# Patient Record
(demographics unavailable — no encounter records)

---

## 2024-12-17 NOTE — PHYSICAL EXAM
[No Acute Distress] : no acute distress [Well Nourished] : well nourished [Well Developed] : well developed [Well-Appearing] : well-appearing [Normal Sclera/Conjunctiva] : normal sclera/conjunctiva [PERRL] : pupils equal round and reactive to light [EOMI] : extraocular movements intact [Normal Outer Ear/Nose] : the outer ears and nose were normal in appearance [Normal Oropharynx] : the oropharynx was normal [No JVD] : no jugular venous distention [No Lymphadenopathy] : no lymphadenopathy [Supple] : supple [Thyroid Normal, No Nodules] : the thyroid was normal and there were no nodules present [No Respiratory Distress] : no respiratory distress  [No Accessory Muscle Use] : no accessory muscle use [Wet Cough] : a wet cough was heard [Audible Wheezing] : audible wheezing was heard [Exp Wheezing] : expiratory wheezing was heard [Clear] : clear [Normal] : normal [Normal Rate] : normal rate  [Regular Rhythm] : with a regular rhythm [Normal S1, S2] : normal S1 and S2 [No Murmur] : no murmur heard [No Carotid Bruits] : no carotid bruits [No Abdominal Bruit] : a ~M bruit was not heard ~T in the abdomen [No Varicosities] : no varicosities [Pedal Pulses Present] : the pedal pulses are present [No Edema] : there was no peripheral edema [No Palpable Aorta] : no palpable aorta [No Extremity Clubbing/Cyanosis] : no extremity clubbing/cyanosis [Soft] : abdomen soft [Non Tender] : non-tender [Non-distended] : non-distended [No Masses] : no abdominal mass palpated [No HSM] : no HSM [Normal Bowel Sounds] : normal bowel sounds [Normal Posterior Cervical Nodes] : no posterior cervical lymphadenopathy [Normal Anterior Cervical Nodes] : no anterior cervical lymphadenopathy [No CVA Tenderness] : no CVA  tenderness [No Spinal Tenderness] : no spinal tenderness [No Joint Swelling] : no joint swelling [Grossly Normal Strength/Tone] : grossly normal strength/tone [No Rash] : no rash [Coordination Grossly Intact] : coordination grossly intact [No Focal Deficits] : no focal deficits [Normal Gait] : normal gait [Deep Tendon Reflexes (DTR)] : deep tendon reflexes were 2+ and symmetric [Normal Affect] : the affect was normal [Normal Insight/Judgement] : insight and judgment were intact [Normal Voice/Communication] : normal voice/communication [Normal TMs] : both tympanic membranes were normal [Normal Percussion] : the chest was normal to percussion [Normal Supraclavicular Nodes] : no supraclavicular lymphadenopathy [Kyphosis] : no kyphosis [Scoliosis] : no scoliosis [Acne] : no acne [Speech Grossly Normal] : speech grossly normal [Memory Grossly Normal] : memory grossly normal [Alert and Oriented x3] : oriented to person, place, and time [Normal Mood] : the mood was normal [de-identified] : cobblestoning of nasopharynx [de-identified] : mild scattered expiratory wheeze [de-identified] : obese

## 2024-12-17 NOTE — ASSESSMENT
[FreeTextEntry1] : 52 year old female presents with viral URI with post nasal drip causing asthma exacerbation.

## 2024-12-17 NOTE — HISTORY OF PRESENT ILLNESS
[Cold Symptoms] : cold symptoms [Moderate] : moderate [___ Days ago] : [unfilled] days ago [Sudden] : suddenly [Constant] : constant [Congestion] : congestion [Cough] : cough [Wheezing] : wheezing [Stable] : stable [Sore Throat] : no sore throat [Chills] : no chills [Anorexia] : no anorexia [Shortness Of Breath] : no shortness of breath [Earache] : no earache [Fatigue] : not fatigue [Headache] : no headache [Fever] : no fever [FreeTextEntry2] : discomfort to the epigastric region due to constant coughing [FreeTextEntry8] : KAEL CINTRON is a 52-year-old F who presents today for evaluation of cough, congestion and postnasal drip for 6 days.  Patient reports that these symptoms started on December 12,2024 for which she presented to [facility]. She was given Rx of Prednisone 40 mg qd x 5 days and Augmentin 875mg BID which she started 4 days ago. She was also given Benzonatate for cough which has helped. Patient also notes rib-epigastric region discomfort due to persistent coughing. She has taken all RX as prescribed but her symptoms have not improved. Patient says she also saw Dr Guo, ENT specialist and he noted this is not related to sinus disease but possible RSV.

## 2024-12-17 NOTE — END OF VISIT
[FreeTextEntry3] : I, Genoveva Wang, acted solely as scribe for Dr. See Stewart DO on this date 12/17/2024.  All medical record entries made by the Scribe were at my, Dr. See Stewart DO direction and personally dictated by me on 12/17/2024, I have reviewed the chart and agree that the record accurately reflects my personal performance of the history, physical exam, assessment and plan. I have also personally directed, reviewed and agreed with the chart.   [Time Spent: ___ minutes] : I have spent [unfilled] minutes of time on the encounter which excludes teaching and separately reported services.

## 2024-12-17 NOTE — PHYSICAL EXAM
[No Acute Distress] : no acute distress [Well Nourished] : well nourished [Well Developed] : well developed [Well-Appearing] : well-appearing [Normal Sclera/Conjunctiva] : normal sclera/conjunctiva [PERRL] : pupils equal round and reactive to light [EOMI] : extraocular movements intact [Normal Outer Ear/Nose] : the outer ears and nose were normal in appearance [Normal Oropharynx] : the oropharynx was normal [No JVD] : no jugular venous distention [No Lymphadenopathy] : no lymphadenopathy [Supple] : supple [Thyroid Normal, No Nodules] : the thyroid was normal and there were no nodules present [No Respiratory Distress] : no respiratory distress  [No Accessory Muscle Use] : no accessory muscle use [Wet Cough] : a wet cough was heard [Audible Wheezing] : audible wheezing was heard [Exp Wheezing] : expiratory wheezing was heard [Clear] : clear [Normal] : normal [Normal Rate] : normal rate  [Regular Rhythm] : with a regular rhythm [Normal S1, S2] : normal S1 and S2 [No Murmur] : no murmur heard [No Carotid Bruits] : no carotid bruits [No Abdominal Bruit] : a ~M bruit was not heard ~T in the abdomen [No Varicosities] : no varicosities [Pedal Pulses Present] : the pedal pulses are present [No Edema] : there was no peripheral edema [No Palpable Aorta] : no palpable aorta [No Extremity Clubbing/Cyanosis] : no extremity clubbing/cyanosis [Soft] : abdomen soft [Non Tender] : non-tender [Non-distended] : non-distended [No Masses] : no abdominal mass palpated [No HSM] : no HSM [Normal Bowel Sounds] : normal bowel sounds [Normal Posterior Cervical Nodes] : no posterior cervical lymphadenopathy [Normal Anterior Cervical Nodes] : no anterior cervical lymphadenopathy [No CVA Tenderness] : no CVA  tenderness [No Spinal Tenderness] : no spinal tenderness [No Joint Swelling] : no joint swelling [Grossly Normal Strength/Tone] : grossly normal strength/tone [No Rash] : no rash [Coordination Grossly Intact] : coordination grossly intact [No Focal Deficits] : no focal deficits [Normal Gait] : normal gait [Deep Tendon Reflexes (DTR)] : deep tendon reflexes were 2+ and symmetric [Normal Affect] : the affect was normal [Normal Insight/Judgement] : insight and judgment were intact [Normal Voice/Communication] : normal voice/communication [Normal TMs] : both tympanic membranes were normal [Normal Percussion] : the chest was normal to percussion [Normal Supraclavicular Nodes] : no supraclavicular lymphadenopathy [Kyphosis] : no kyphosis [Scoliosis] : no scoliosis [Acne] : no acne [Speech Grossly Normal] : speech grossly normal [Memory Grossly Normal] : memory grossly normal [Alert and Oriented x3] : oriented to person, place, and time [Normal Mood] : the mood was normal [de-identified] : cobblestoning of nasopharynx [de-identified] : mild scattered expiratory wheeze [de-identified] : obese

## 2024-12-17 NOTE — REVIEW OF SYSTEMS
[Postnasal Drip] : postnasal drip [Wheezing] : wheezing [Cough] : cough [Negative] : Heme/Lymph [Fatigue] : fatigue [Shortness Of Breath] : no shortness of breath [Dyspnea on Exertion] : no dyspnea on exertion [Muscle Pain] : muscle pain [Back Pain] : no back pain [FreeTextEntry6] : rib/chest discomfort due to coughing

## 2024-12-17 NOTE — PLAN
[FreeTextEntry1] : ID/Pulmonary URI-PND-Asthma-cough - finish course of Prednisone 40 mg qd x 5 days -continue to take Augmentin 875mg p.o BID  -start Promethazine w/ Codeine 5CC q 6 hours PRN cough - prescription for chest X-ray given -check nasal swab for respiratory panel -add non-sedating antihistamine daily to dry out PND -increase hydration and rest, call for worsening or persistent symptoms

## 2025-02-06 NOTE — HISTORY OF PRESENT ILLNESS
[FreeTextEntry1] : annual wellness visit [de-identified] : Patient is a 52-year-old female with a past medical history as below who presents for an annual wellness visit. Patient denies heat/cold intolerance on Synthroid (Levothyroxine Sodium). Asthma has been well-managed on Albuterol. Patient has been taking Pantoprazole daily as prescribed. Patient has seen GYN, Dr. Soni in June 2024. Her last Mammogram and Breast US were in March 2024 at [facility]. Patient had a screening colonoscopy in April 2024 with gastroenterologist, Dr. Mcwilliams. Patient has been seeing endocrinologist, Dr. Perlman secondary to history of hypothyroidism. Patient notes seeing a cardiologist in the past after a syncopal episode. Patient denies any significant issues with vision. She denies insomnia. Patient notes increased stress secondary to a busy professional/personal life which causes fatigue and secondarily causes anxiety and anxiousness for which she takes Escitalopram 10mg QD. Patient did not receive the flu vaccine for 5320-5844 season.. She believes she received the Pneumonia Vaccine several years ago at pulmonologist, Dr. Mendoza's office. She has received 5 doses of the COVID-19 Vaccine (Pfizer). Patient cannot recall precisely when she received T-dap vaccine but says she is probably reaching 10 year window. Patient says her diet and exercise has not been optimal recently, but she says at times she would run on the treadmill at home. Patient is fasting today.   Patients complain of eczema on bilateral lower extremities, uses topical steroid cream to manage it and she sees Dr. Christie of dermatology.

## 2025-02-06 NOTE — PLAN
[FreeTextEntry1] : Constitutional fatigue - check CBC, CMP, and thyroid panel  Pulmonary asthma -continue Albuterol Sulfate  MCG/ACT p.r.n. - follow up with pulmonologist, Dr. Mendoza as necessary  Endocrinology hypothyroidism - continue Synthroid (Levothyroxine Sodium) 50mcg p.o.q.d. as directed - continue to f/u with Dr. Perlman - check thyroid panel Dermatology Rosacea- continue to f/u with Dr. Christie and continue to use the topical steroid cream as prescribed. Gynecology Results of most recent breast imaging (Mammogram/Breast US) to be requested from [facility]; continue to f/u with Dr. Soni. Gastroenterology GERD - c/w Pantoprazole 40mg - continue antireflux measures Immunization flu vaccine - denied receiving for 0399-4445 season Pneumovax 23 - evidence of vaccine administration to be requested from pulmonologist, Dr. Mendoza's office S/p COVID-19 Vaccine (Pfizer, x5) - received at her local pharmacy. T-dap- does not recall precise date but reaching 10 year window, will think about receiving in the future.  check EKG (results as above)  blood work- (female panel, hemoglobin A1C, and thyroid panel)

## 2025-02-06 NOTE — HEALTH RISK ASSESSMENT
[Good] : ~his/her~  mood as  good [Yes] : Yes [Monthly or less (1 pt)] : Monthly or less (1 point) [1 or 2 (0 pts)] : 1 or 2 (0 points) [Never (0 pts)] : Never (0 points) [No] : In the past 12 months have you used drugs other than those required for medical reasons? No [No falls in past year] : Patient reported no falls in the past year [0] : 2) Feeling down, depressed, or hopeless: Not at all (0) [PHQ-2 Negative - No further assessment needed] : PHQ-2 Negative - No further assessment needed [Audit-CScore] : 1 [de-identified] : Gym 2x per week.  [[id number]] : 0 [Reports changes in vision] : Reports no changes in vision [MammogramDate] : 09/20 [MammogramComments] : No mammographic or sonographic evidence of malignancy; no significant change from prior examinations; BI-RADS Category 2: Benign; Results of most recent breast imaging (Mammogram/Breast US) to be requested from [facility]. [PapSmearDate] : 08/19 [PapSmearComments] : NILM.  [ColonoscopyDate] : 07/20 [ColonoscopyComments] : 1-mm colon polyp in ascending colon, mild internal hemorrhoids.

## 2025-02-06 NOTE — HISTORY OF PRESENT ILLNESS
[FreeTextEntry1] : annual wellness visit [de-identified] : Patient is a 52-year-old female with a past medical history as below who presents for an annual wellness visit. Patient denies heat/cold intolerance on Synthroid (Levothyroxine Sodium). Asthma has been well-managed on Albuterol. Patient has been taking Pantoprazole daily as prescribed. Patient has seen GYN, Dr. Soni in June 2024. Her last Mammogram and Breast US were in March 2024 at [facility]. Patient had a screening colonoscopy in April 2024 with gastroenterologist, Dr. Mcwilliams. Patient has been seeing endocrinologist, Dr. Perlman secondary to history of hypothyroidism. Patient notes seeing a cardiologist in the past after a syncopal episode. Patient denies any significant issues with vision. She denies insomnia. Patient notes increased stress secondary to a busy professional/personal life which causes fatigue and secondarily causes anxiety and anxiousness for which she takes Escitalopram 10mg QD. Patient did not receive the flu vaccine for 1657-0159 season.. She believes she received the Pneumonia Vaccine several years ago at pulmonologist, Dr. Mendoza's office. She has received 5 doses of the COVID-19 Vaccine (Pfizer). Patient cannot recall precisely when she received T-dap vaccine but says she is probably reaching 10 year window. Patient says her diet and exercise has not been optimal recently, but she says at times she would run on the treadmill at home. Patient is fasting today.   Patients complain of eczema on bilateral lower extremities, uses topical steroid cream to manage it and she sees Dr. Christie of dermatology.

## 2025-02-06 NOTE — ASSESSMENT
[FreeTextEntry1] : KAEL CINTRON is a 52 year old F with a past medical history as above who presents for an  annual wellness visit.

## 2025-02-06 NOTE — PHYSICAL EXAM
[No Acute Distress] : no acute distress [Well Nourished] : well nourished [Well Developed] : well developed [Well-Appearing] : well-appearing [Normal Sclera/Conjunctiva] : normal sclera/conjunctiva [PERRL] : pupils equal round and reactive to light [EOMI] : extraocular movements intact [Normal Outer Ear/Nose] : the outer ears and nose were normal in appearance [Normal Oropharynx] : the oropharynx was normal [No JVD] : no jugular venous distention [No Lymphadenopathy] : no lymphadenopathy [Supple] : supple [Thyroid Normal, No Nodules] : the thyroid was normal and there were no nodules present [No Respiratory Distress] : no respiratory distress  [No Accessory Muscle Use] : no accessory muscle use [Clear to Auscultation] : lungs were clear to auscultation bilaterally [Normal Rate] : normal rate  [Regular Rhythm] : with a regular rhythm [Normal S1, S2] : normal S1 and S2 [No Murmur] : no murmur heard [No Carotid Bruits] : no carotid bruits [No Abdominal Bruit] : a ~M bruit was not heard ~T in the abdomen [No Varicosities] : no varicosities [Pedal Pulses Present] : the pedal pulses are present [No Edema] : there was no peripheral edema [No Palpable Aorta] : no palpable aorta [No Extremity Clubbing/Cyanosis] : no extremity clubbing/cyanosis [Soft] : abdomen soft [Non Tender] : non-tender [Non-distended] : non-distended [No Masses] : no abdominal mass palpated [No HSM] : no HSM [Normal Bowel Sounds] : normal bowel sounds [Normal Posterior Cervical Nodes] : no posterior cervical lymphadenopathy [Normal Anterior Cervical Nodes] : no anterior cervical lymphadenopathy [No CVA Tenderness] : no CVA  tenderness [No Spinal Tenderness] : no spinal tenderness [No Joint Swelling] : no joint swelling [Grossly Normal Strength/Tone] : grossly normal strength/tone [No Rash] : no rash [Coordination Grossly Intact] : coordination grossly intact [No Focal Deficits] : no focal deficits [Normal Gait] : normal gait [Deep Tendon Reflexes (DTR)] : deep tendon reflexes were 2+ and symmetric [Normal Affect] : the affect was normal [Normal Insight/Judgement] : insight and judgment were intact [Normal Voice/Communication] : normal voice/communication [Normal TMs] : both tympanic membranes were normal [Normal Nasal Mucosa] : the nasal mucosa was normal [Normal Supraclavicular Nodes] : no supraclavicular lymphadenopathy [Kyphosis] : no kyphosis [Scoliosis] : no scoliosis [Speech Grossly Normal] : speech grossly normal [Memory Grossly Normal] : memory grossly normal [Alert and Oriented x3] : oriented to person, place, and time [Normal Mood] : the mood was normal [de-identified] : done by gyn [de-identified] : overweight, umblical hernia  [FreeTextEntry1] : done by gastroenterologist [de-identified] : scattered maculopapular lesions lower extremities

## 2025-02-06 NOTE — PLAN
[FreeTextEntry1] : Constitutional fatigue - check CBC, CMP, and thyroid panel  Pulmonary asthma -continue Albuterol Sulfate  MCG/ACT p.r.n. - follow up with pulmonologist, Dr. Mendoza as necessary  Endocrinology hypothyroidism - continue Synthroid (Levothyroxine Sodium) 50mcg p.o.q.d. as directed - continue to f/u with Dr. Perlman - check thyroid panel Dermatology Rosacea- continue to f/u with Dr. Christie and continue to use the topical steroid cream as prescribed. Gynecology Results of most recent breast imaging (Mammogram/Breast US) to be requested from [facility] Radiology; continue to f/u with Dr. Soni. Gastroenterology GERD - c/w Pantoprazole 40mg - continue antireflux measures Immunization flu vaccine - denied receiving for 6244-8852 season Pneumovax 23 - evidence of vaccine administration to be requested from pulmonologist, Dr. Mendoza's office S/p COVID-19 Vaccine (Pfizer, x5) - received at her local pharmacy. T-dap- does not recall precise date but reaching 10 year window, will think about receiving in the future.  check EKG (results as above)  blood work- (female panel, hemoglobin A1C, and thyroid panel)

## 2025-02-06 NOTE — PHYSICAL EXAM
[No Acute Distress] : no acute distress [Well Nourished] : well nourished [Well Developed] : well developed [Well-Appearing] : well-appearing [Normal Sclera/Conjunctiva] : normal sclera/conjunctiva [PERRL] : pupils equal round and reactive to light [EOMI] : extraocular movements intact [Normal Outer Ear/Nose] : the outer ears and nose were normal in appearance [Normal Oropharynx] : the oropharynx was normal [No JVD] : no jugular venous distention [No Lymphadenopathy] : no lymphadenopathy [Supple] : supple [Thyroid Normal, No Nodules] : the thyroid was normal and there were no nodules present [No Respiratory Distress] : no respiratory distress  [No Accessory Muscle Use] : no accessory muscle use [Clear to Auscultation] : lungs were clear to auscultation bilaterally [Normal Rate] : normal rate  [Regular Rhythm] : with a regular rhythm [Normal S1, S2] : normal S1 and S2 [No Murmur] : no murmur heard [No Carotid Bruits] : no carotid bruits [No Abdominal Bruit] : a ~M bruit was not heard ~T in the abdomen [No Varicosities] : no varicosities [Pedal Pulses Present] : the pedal pulses are present [No Edema] : there was no peripheral edema [No Palpable Aorta] : no palpable aorta [No Extremity Clubbing/Cyanosis] : no extremity clubbing/cyanosis [Soft] : abdomen soft [Non Tender] : non-tender [Non-distended] : non-distended [No Masses] : no abdominal mass palpated [No HSM] : no HSM [Normal Bowel Sounds] : normal bowel sounds [Normal Posterior Cervical Nodes] : no posterior cervical lymphadenopathy [Normal Anterior Cervical Nodes] : no anterior cervical lymphadenopathy [No CVA Tenderness] : no CVA  tenderness [No Spinal Tenderness] : no spinal tenderness [No Joint Swelling] : no joint swelling [Grossly Normal Strength/Tone] : grossly normal strength/tone [No Rash] : no rash [Coordination Grossly Intact] : coordination grossly intact [No Focal Deficits] : no focal deficits [Normal Gait] : normal gait [Deep Tendon Reflexes (DTR)] : deep tendon reflexes were 2+ and symmetric [Normal Affect] : the affect was normal [Normal Insight/Judgement] : insight and judgment were intact [Normal Voice/Communication] : normal voice/communication [Normal TMs] : both tympanic membranes were normal [Normal Nasal Mucosa] : the nasal mucosa was normal [Normal Supraclavicular Nodes] : no supraclavicular lymphadenopathy [Kyphosis] : no kyphosis [Scoliosis] : no scoliosis [Speech Grossly Normal] : speech grossly normal [Memory Grossly Normal] : memory grossly normal [Alert and Oriented x3] : oriented to person, place, and time [Normal Mood] : the mood was normal [de-identified] : done by gyn [de-identified] : overweight, umblical hernia  [FreeTextEntry1] : done by gastroenterologist [de-identified] : scattered maculopapular lesions lower extremities

## 2025-02-06 NOTE — REVIEW OF SYSTEMS
[Fatigue] : fatigue [Postnasal Drip] : postnasal drip [Negative] : Heme/Lymph [de-identified] : increased stress  [Skin Rash] : skin rash [Anxiety] : anxiety

## 2025-02-06 NOTE — REVIEW OF SYSTEMS
[Fatigue] : fatigue [Postnasal Drip] : postnasal drip [Negative] : Heme/Lymph [de-identified] : increased stress  [Skin Rash] : skin rash [Anxiety] : anxiety

## 2025-02-06 NOTE — HEALTH RISK ASSESSMENT
PRE-OP EVALUATION    Patient Name: Cooper Blank    Pre-op Diagnosis: Calculi, ureter [N20.1]    Procedure(s):  CYSTOSCOPY, INSERTION OF LEFT URETERAL STENT    Surgeon(s) and Role:     Carlie Serrano MD - Primary    Pre-op vitals reviewed.   Temp: 99.2 Intravenous, Q6H PRN  [MAR Hold] Metoclopramide HCl (REGLAN) injection 10 mg, 10 mg, Intravenous, Q8H PRN        Outpatient Medications:  HYDROcodone-acetaminophen 5-325 MG Oral Tab, Take 1 tablet by mouth every 6 (six) hours as needed for Pain., Disp: 10 LOCALIZATION WIRE 1 SITE RIGHT ([id number]) Right 08/2015    benign. • TONSILLECTOMY       Social History    Tobacco Use      Smoking status: Never Smoker      Smokeless tobacco: Never Used    Alcohol use:  Yes      Alcohol/week: 1.7 standard drinks      T [Good] : ~his/her~  mood as  good [Yes] : Yes [Monthly or less (1 pt)] : Monthly or less (1 point) [1 or 2 (0 pts)] : 1 or 2 (0 points) [Never (0 pts)] : Never (0 points) [No] : In the past 12 months have you used drugs other than those required for medical reasons? No [No falls in past year] : Patient reported no falls in the past year [0] : 2) Feeling down, depressed, or hopeless: Not at all (0) [PHQ-2 Negative - No further assessment needed] : PHQ-2 Negative - No further assessment needed [Audit-CScore] : 1 [de-identified] : Gym 2x per week.  [[id number]] : 0 [Reports changes in vision] : Reports no changes in vision [MammogramDate] : 09/20 [MammogramComments] : No mammographic or sonographic evidence of malignancy; no significant change from prior examinations; BI-RADS Category 2: Benign; Results of most recent breast imaging (Mammogram/Breast US[facility]. [PapSmearDate] : 08/19 [PapSmearComments] : NILM.  [ColonoscopyDate] : 07/20 [ColonoscopyComments] : 1-mm colon polyp in ascending colon, mild internal hemorrhoids.

## 2025-02-06 NOTE — END OF VISIT
[FreeTextEntry3] : I, Genoveva Wang, acted solely as scribe for Dr. See Stewart DO on this date 02/06/2025.  All medical record entries made by the Scribe were at my, Dr. See Stewart DO direction and personally dictated by me on 02/06/2025, I have reviewed the chart and agree that the record accurately reflects my personal performance of the history, physical exam, assessment and plan. I have also personally directed, reviewed and agreed with the chart.   [Time Spent: ___ minutes] : I have spent [unfilled] minutes of time on the encounter which excludes teaching and separately reported services.

## 2025-04-01 NOTE — HISTORY OF PRESENT ILLNESS
[FreeTextEntry1] : 50-year-old mother of two with history of hypothyroidism, 2 C-sections who presents for follow up after recent ER visit for GI symptoms.    Since March 8, has been experiencing nausea, diarrhea, distended abdomen, swollen lymph nodes, headache - Had shrimp - all weak has not been eating - had soup - had urgent care visit - says resolving abdominal pain - says tender - will order stool test - abdominal ultrasound - office visit.  She then went [place] ER.   Says her symptoms lasted for a week - she went to [facility] ER - had CT scan 3/14/2025 with IV contrast of abdomen/pelvis - 2.3 cm indeterminant hypodense lesion in the right hepatic lesion.  Scattered diverticulosis.   Still has LUQ pain for years says "it just kind of there" - once a week - 3/10 on pain scale.  Says has had a lot constipation since hysterectomy 11/2023 - Says takes MiraLAX once a day - started taking probiotics and it has been helping.  No melena and no hematochezia.  Wants to lose weight.        Initial office visit 3/2020: The patient reports she's been having a soreness in the left upper quadrant area since the past 3 weeks - she was prescribed Prilosec over the counter and she says she is getting better. Initially she was nauseous without any vomiting. She also reports that she's been constipated for many years but since the past couple of months it's been getting worse - due to changes her diet. Recently she's been taking MiraLAX, and she has been having a bowel movement. She denies any red blood or black blood in her stool. She denies any trouble swallowing, pain upon swallowing food, abnormal weight loss. She has never had an upper endoscopy or colonoscopy before. Patient reports her maternal aunt was diagnosed with rectal cancer in her 70s. Her mother was diagnosed with Morales's at one point and has had colon polyps removed.   Office visit 10/2022: A few months ago, started having reflux - took Prilosec two weeks - a lot of stress - had Covid in 1/2022 - saw a cardiology for palpitations. Says she tastes acid in her mouth - she has had hoarseness of voice - clearing of throat. Had CT chest was told a thickening of esophagus. CT angio on 10/1/2022 - thickened of gastric cardia/fundus.  No dysphagia, no vomiting. No loss weight. No melena and no hematochezia. Recent Stress test negative.   Discussion/Summary 11/2022: Feels oozy, shaky, nausea - no fever - yesterday - feels better today. Felt congested yesterday and felt up after decongested. Advised urgent care - she will watch symptoms.   Office visit 8/2023:  This past Sunday has left upper abdominal pain - ER visit [facility] - was told she likely has pain from constipation - had been having stools everyday but not enough - felt distended. she was taking antibiotics (doxycycline and then Keflex) for a skin infection. LUQ pain for 3 weeks - intermittent - perhaps constant - 5/10 on pain scale Was advised to take MiraLAX - has not been eating, has no appetite. Says has had a large amount of stool today - left upper abdominal pain better. Yesterday more reflux. Has felt gassy. Says feeling anxious - recently prescribed Lexapro - tearful in office.   Discussion/Summary 8/2023  Spoke with patient and reviewed recent MRI results - benign findings - she mauricio have cervical bx this morning - says has been feeling better since regular use of Miralax/Prunes - also recently started lexapro.   Discussion/Summary 4/2024:  She says she had diarrhea yesterday and today - no fever - says no new abdominal pain - typical LUQ discomfort -advised urgent care if abdominal pain, fever etc. results of colonoscopy polyps discussed with her - repeat Colonoscopy in 3 yrs rather than 5 yrs.

## 2025-04-01 NOTE — PHYSICAL EXAM
[Bowel Sounds] : normal bowel sounds [No Masses] : no abdominal mass palpated [Abdomen Soft] : soft [] : no hepatosplenomegaly [Cervical Lymph Nodes Enlarged Posterior Bilaterally] : no posterior cervical lymphadenopathy [Supraclavicular Lymph Nodes Enlarged Bilaterally] : no supraclavicular lymphadenopathy [No Clubbing, Cyanosis] : no clubbing or cyanosis of the fingernails [Normal Color / Pigmentation] : normal skin color and pigmentation [Normal] : oriented to person, place, and time [de-identified] : Lower abdominal tenderness says mild since hysterectomy

## 2025-04-01 NOTE — ASSESSMENT
[FreeTextEntry1] : IMPRESSION: #  Nausea, diarrhea since 3/8/2025 lasted for one week -  [facility] ER 3/14/2025 - CT scan 3/14/2025 with IV contrast of abdomen/pelvis - 2.3 cm indeterminant hypodense lesion in the right hepatic lesion. Scattered diverticulosis.  Had MRI in 2023, and 2020 hemangioma and FNH described  # LUQ pain for years  # Liver Hemangioma -  MRI Abd w/wo IV contrast on 8/2023:  3 cm right hepatic lobe hemangioma.  1.3 hypervascular lesion 1.3 cm, appears present in retrospect dating back to 2020, and is suspected to reflect focal nodular hyperplasia.  Uterine fibroid.   - CT A/P with IV contrast on 7/31/2023: 3 cm right hepatic lobe hypodensity indeterminate and appears increased in size when compared to the CT chest in 10/2022. 1.5 cm hyper vascular lesion of the right hepatic lobe. Subcm lipoma of the uncinate process. Colon diverticulosis. Large stool burden. Sclerotic focus of the right 10th rib - can consider bone scan. Enlarged fibroid uterus with heterogenous appearance of the cervix (Seeing GYN). - MRI with IV contrast on 3/2020: 2.2 cm hemangioma  # Reflux symptoms in past - EGD by Dr. Mcwilliams on 11/2022: Nml esophagus with nml GEJ s/p bx (neg for BE). Few small gastric polyp's s/p bx (hyperplastic neg for IM). Mild antral erythema s/p bx (Neg for HP and IM). Nml duodenum s/p bx (neg for celiac disease). - CT Angio on 10/1/2022 - thickened of gastric cardia/fundus. - Mother with Morales's  #  Tubular adenomas  -  Colonoscopy by Dr. Mcwilliams on 4/2024:  Two small ascending colon polyps removed (TA x 2).  Rpt 3 yrs.   - Colonoscopy by Dr. Mcwilliams on 7/2020: 1 mm colon polyp removed from ascending colon s/p cold snare (lymphoid aggregates). Repeat in 3 years.  # Family history of colon cancer - Aunt had rectal cancer - Mother "always had" colon polyps  # Comorbidities: History of hypothyroidism, 2 C-sections      PLAN: Discussed recent symptoms likely from food poisoning - now resolved.  Discussed unclear cause of years of LUQ pain -discussed possible pain management - she would like off  Discussed a capsule endoscopy to assess entire small bowel - risks/benefits/alternatives reviewed she would hold off.  Miralax once a day, may increase twice a day.  Follow up in 6 months

## 2025-04-01 NOTE — HISTORY OF PRESENT ILLNESS
[FreeTextEntry1] : 50-year-old mother of two with history of hypothyroidism, 2 C-sections who presents for follow up after recent ER visit for GI symptoms.    Since March 8, has been experiencing nausea, diarrhea, distended abdomen, swollen lymph nodes, headache - Had shrimp - all weak has not been eating - had soup - had urgent care visit - says resolving abdominal pain - says tender - will order stool test - abdominal ultrasound - office visit.  She then went [place] ER.   Says her symptoms lasted for a week - she went to [facility] ER - had CT scan 3/14/2025 with IV contrast of abdomen/pelvis - 2.3 cm indeterminant hypodense lesion in the right hepatic lesion.  Scattered diverticulosis.   Still has LUQ pain for years says "it just kind of there" - once a week - 3/10 on pain scale.  Says has had a lot constipation since hysterectomy 11/2023 - Says takes MiraLAX once a day - started taking probiotics and it has been helping.  No melena and no hematochezia.  Wants to lose weight.        Initial office visit 3/2020: The patient reports she's been having a soreness in the left upper quadrant area since the past 3 weeks - she was prescribed Prilosec over the counter and she says she is getting better. Initially she was nauseous without any vomiting. She also reports that she's been constipated for many years but since the past couple of months it's been getting worse - due to changes her diet. Recently she's been taking MiraLAX, and she has been having a bowel movement. She denies any red blood or black blood in her stool. She denies any trouble swallowing, pain upon swallowing food, abnormal weight loss. She has never had an upper endoscopy or colonoscopy before. Patient reports her maternal aunt was diagnosed with rectal cancer in her 70s. Her mother was diagnosed with Morales's at one point and has had colon polyps removed.   Office visit 10/2022: A few months ago, started having reflux - took Prilosec two weeks - a lot of stress - had Covid in 1/2022 - saw a cardiology for palpitations. Says she tastes acid in her mouth - she has had hoarseness of voice - clearing of throat. Had CT chest was told a thickening of esophagus. CT angio on 10/1/2022 - thickened of gastric cardia/fundus.  No dysphagia, no vomiting. No loss weight. No melena and no hematochezia. Recent Stress test negative.   Discussion/Summary 11/2022: Feels oozy, shaky, nausea - no fever - yesterday - feels better today. Felt congested yesterday and felt up after decongested. Advised urgent care - she will watch symptoms.   Office visit 8/2023:  This past Sunday has left upper abdominal pain - ER visit [facility] - was told she likely has pain from constipation - had been having stools everyday but not enough - felt distended. she was taking antibiotics (doxycycline and then Keflex) for a skin infection. LUQ pain for 3 weeks - intermittent - perhaps constant - 5/10 on pain scale Was advised to take MiraLAX - has not been eating, has no appetite. Says has had a large amount of stool today - left upper abdominal pain better. Yesterday more reflux. Has felt gassy. Says feeling anxious - recently prescribed Lexapro - tearful in office.   Discussion/Summary 8/2023  Spoke with patient and reviewed recent MRI results - benign findings - she mauricio have cervical bx this morning - says has been feeling better since regular use of Miralax/Prunes - also recently started lexapro.   Discussion/Summary 4/2024:  She says she had diarrhea yesterday and today - no fever - says no new abdominal pain - typical LUQ discomfort -advised urgent care if abdominal pain, fever etc. results of colonoscopy polyps discussed with her - repeat Colonoscopy in 3 yrs rather than 5 yrs.

## 2025-04-01 NOTE — PHYSICAL EXAM
[Bowel Sounds] : normal bowel sounds [No Masses] : no abdominal mass palpated [Abdomen Soft] : soft [] : no hepatosplenomegaly [Cervical Lymph Nodes Enlarged Posterior Bilaterally] : no posterior cervical lymphadenopathy [Supraclavicular Lymph Nodes Enlarged Bilaterally] : no supraclavicular lymphadenopathy [No Clubbing, Cyanosis] : no clubbing or cyanosis of the fingernails [Normal Color / Pigmentation] : normal skin color and pigmentation [Normal] : oriented to person, place, and time [de-identified] : Lower abdominal tenderness says mild since hysterectomy